# Patient Record
Sex: FEMALE | Race: WHITE | NOT HISPANIC OR LATINO | Employment: STUDENT | URBAN - METROPOLITAN AREA
[De-identification: names, ages, dates, MRNs, and addresses within clinical notes are randomized per-mention and may not be internally consistent; named-entity substitution may affect disease eponyms.]

---
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
## 2024-01-11 DIAGNOSIS — J45.30 MILD PERSISTENT ASTHMA WITHOUT COMPLICATION (HHS-HCC): ICD-10-CM

## 2024-01-11 RX ORDER — FLUTICASONE PROPIONATE 50 UG/1
1 POWDER, METERED RESPIRATORY (INHALATION) 2 TIMES DAILY
Qty: 60 EACH | Refills: 3 | Status: SHIPPED | OUTPATIENT
Start: 2024-01-11

## 2024-01-22 ENCOUNTER — LAB (OUTPATIENT)
Dept: LAB | Facility: LAB | Age: 19
End: 2024-01-22
Payer: COMMERCIAL

## 2024-01-22 ENCOUNTER — OFFICE VISIT (OUTPATIENT)
Dept: ALLERGY | Facility: CLINIC | Age: 19
End: 2024-01-22
Payer: COMMERCIAL

## 2024-01-22 VITALS
RESPIRATION RATE: 18 BRPM | SYSTOLIC BLOOD PRESSURE: 136 MMHG | WEIGHT: 181.7 LBS | HEIGHT: 66 IN | TEMPERATURE: 98.2 F | BODY MASS INDEX: 29.2 KG/M2 | DIASTOLIC BLOOD PRESSURE: 64 MMHG | HEART RATE: 90 BPM | OXYGEN SATURATION: 98 %

## 2024-01-22 DIAGNOSIS — J45.990 ASTHMA, EXERCISE INDUCED (HHS-HCC): Primary | ICD-10-CM

## 2024-01-22 DIAGNOSIS — J32.9 CHRONIC RHINOSINUSITIS: ICD-10-CM

## 2024-01-22 PROCEDURE — 36415 COLL VENOUS BLD VENIPUNCTURE: CPT

## 2024-01-22 PROCEDURE — 99214 OFFICE O/P EST MOD 30 MIN: CPT | Performed by: ALLERGY & IMMUNOLOGY

## 2024-01-22 PROCEDURE — 86003 ALLG SPEC IGE CRUDE XTRC EA: CPT

## 2024-01-22 PROCEDURE — 82785 ASSAY OF IGE: CPT

## 2024-01-22 PROCEDURE — 86317 IMMUNOASSAY INFECTIOUS AGENT: CPT

## 2024-01-22 PROCEDURE — 96160 PT-FOCUSED HLTH RISK ASSMT: CPT | Performed by: ALLERGY & IMMUNOLOGY

## 2024-01-22 ASSESSMENT — ENCOUNTER SYMPTOMS
RESPIRATORY NEGATIVE: 1
MUSCULOSKELETAL NEGATIVE: 1
GASTROINTESTINAL NEGATIVE: 1
CONSTITUTIONAL NEGATIVE: 1
CARDIOVASCULAR NEGATIVE: 1
EYES NEGATIVE: 1
NEUROLOGICAL NEGATIVE: 1
ENDOCRINE NEGATIVE: 1
PSYCHIATRIC NEGATIVE: 1
HEMATOLOGIC/LYMPHATIC NEGATIVE: 1

## 2024-01-22 NOTE — PROGRESS NOTES
"Subjective   Patient ID: Kenyetta Mclaughlin is a 18 y.o. female.    Chief Complaint: follow up  17 yo woman last seen 8/11/23 June 2023 had negative skin tests to environmental allergy triggers.  She has a history of chronic sinusitis and mild persistent asthma  Last visit received HIB and Pneumovax.  Repeat labs showed excellent response to vaccines.    Patient uses her Flonase for congestion.  She feels that she was more congested at home more so than at college.  There is a cat and a dog in the home.    Patient is going to [facility] and staying in a home with 2 cats.  She is a student at [employer].    Review of Systems   Constitutional: Negative.    HENT: Negative.     Eyes: Negative.    Respiratory: Negative.     Cardiovascular: Negative.    Gastrointestinal: Negative.    Endocrine: Negative.    Genitourinary: Negative.    Musculoskeletal: Negative.    Neurological: Negative.    Hematological: Negative.    Psychiatric/Behavioral: Negative.       /64   Pulse 90   Temp 36.8 °C (98.2 °F) (Oral)   Resp 18   Ht 1.676 m (5' 6\")   Wt 82.4 kg (181 lb 11.2 oz)   SpO2 98%   BMI 29.33 kg/m²     Objective   Physical Exam  Vitals and nursing note reviewed.   Constitutional:       Appearance: Normal appearance.   HENT:      Right Ear: Tympanic membrane normal.      Left Ear: Tympanic membrane normal.      Nose: Nose normal.      Mouth/Throat:      Mouth: Mucous membranes are moist.   Eyes:      Conjunctiva/sclera: Conjunctivae normal.      Pupils: Pupils are equal, round, and reactive to light.   Cardiovascular:      Rate and Rhythm: Normal rate and regular rhythm.      Heart sounds: Normal heart sounds.   Pulmonary:      Effort: Pulmonary effort is normal.      Breath sounds: Normal breath sounds.   Abdominal:      General: Bowel sounds are normal.      Palpations: Abdomen is soft.   Musculoskeletal:         General: Normal range of motion.   Skin:     General: Skin is warm and dry.      Capillary Refill: " Capillary refill takes less than 2 seconds.   Neurological:      General: No focal deficit present.      Mental Status: She is alert and oriented to person, place, and time.   Psychiatric:         Mood and Affect: Mood normal.     Laboratory:   Haemophilus Influenzae b Ab IgG  Order: [id number]  Status: Final result       Visible to patient: No (inaccessible in [facility])       Dx: Chronic rhinitis    0 Result Notes       Component  Ref Range & Units 3 mo ago 7 mo ago   Haemophilus influenzae b Antibody, IgG  ug/mL 14.9 0.0 CM   Comment: INTERPRETIVE INFORMATION: H. Influenzae b Ab, IgG        Component  Ref Range & Units 3 mo ago 7 mo ago   Serotype 1  ug/mL 5.09 0.10   Serotype 2  ug/mL >27.30 1.97   Serotype 3  ug/mL 4.80 3.85   Serotype 4  ug/mL 2.63 0.19   Serotype 5  ug/mL 7.02 1.76   Serotype 8  ug/mL 1.85 0.28   Serotype 9N  ug/mL 3.74 1.38   Serotype 12F  ug/mL 0.70 0.32   Serotype 14  ug/mL 3.89 0.45   Serotype 17F  ug/mL 4.31 2.99   Serotype 19F  ug/mL 27.39 1.71   Serotype 20  ug/mL 5.83 0.70   Serotype 22F  ug/mL 11.54 2.40   Serotype 23F  ug/mL 2.26 0.70   Serotype 6B(26)  ug/mL 1.63 2.11   Serotype 10A(34)  ug/mL 3.53 4.03   Serotype 11A(43)  ug/mL 1.31 2.34   Serotype 7F(51)  ug/mL 4.17 1.74   Serotype 15B(54)  ug/mL 9.12 0.72   Serotype 18C(56)  ug/mL 1.97 0.79   Serotype 19A(57)  ug/mL 7.08 4.47   Serotype 9V(68)  ug/mL 3.38 0.64   Serotype 33F(70)  ug/mL 4.04 0.33   Pneumo Serotype Interpretation See Note See Note CM   Comment: INTERPRETIVE INFORMATION: Streptococcus pneumoniae Antibodies, IgG   Immunoglobulins and complement were normal.    Assessment/Plan    19 yo with history of chronic rhino sinusitis. She has had increased congestion at home now.  She is using Flonase. There is concern for allergy to pets. She is going to [place] and living with a family who has 2 cats.  Asthma is stable on Flovent.  -repeat labs and I will call results  -continue with current medications.    Follow up 6  months

## 2024-01-23 LAB

## 2024-01-26 LAB
HAEM INFLU B IGG SER-MCNC: 3.1 UG/ML
S PN DA SERO 19F IGG SER-MCNC: >112.96 UG/ML
S PNEUM DA 1 IGG SER-MCNC: 4.48 UG/ML
S PNEUM DA 10A IGG SER-MCNC: 2 UG/ML
S PNEUM DA 11A IGG SER-MCNC: 1.99 UG/ML
S PNEUM DA 12F IGG SER-MCNC: 0.57 UG/ML
S PNEUM DA 14 IGG SER-MCNC: 4.3 UG/ML
S PNEUM DA 15B IGG SER-MCNC: 14.93 UG/ML
S PNEUM DA 17F IGG SER-MCNC: 4.68 UG/ML
S PNEUM DA 18C IGG SER-MCNC: 3.79 UG/ML
S PNEUM DA 19A IGG SER-MCNC: 4.57 UG/ML
S PNEUM DA 2 IGG SER-MCNC: >27.3 UG/ML
S PNEUM DA 20A IGG SER-MCNC: 13.21 UG/ML
S PNEUM DA 22F IGG SER-MCNC: 8.81 UG/ML
S PNEUM DA 23F IGG SER-MCNC: 2.52 UG/ML
S PNEUM DA 3 IGG SER-MCNC: 2.7 UG/ML
S PNEUM DA 33F IGG SER-MCNC: 10.33 UG/ML
S PNEUM DA 4 IGG SER-MCNC: 8.41 UG/ML
S PNEUM DA 5 IGG SER-MCNC: 10.23 UG/ML
S PNEUM DA 6B IGG SER-MCNC: 2.38 UG/ML
S PNEUM DA 7F IGG SER-MCNC: 9.73 UG/ML
S PNEUM DA 8 IGG SER-MCNC: 3.88 UG/ML
S PNEUM DA 9N IGG SER-MCNC: >11.04 UG/ML
S PNEUM DA 9V IGG SER-MCNC: >11.73 UG/ML
S PNEUM SEROTYPE IGG SER-IMP: NORMAL

## 2024-01-29 ENCOUNTER — TELEPHONE (OUTPATIENT)
Dept: ALLERGY | Facility: CLINIC | Age: 19
End: 2024-01-29
Payer: COMMERCIAL

## 2024-02-20 DIAGNOSIS — J45.30 MILD PERSISTENT ASTHMA WITHOUT COMPLICATION (HHS-HCC): Primary | ICD-10-CM

## 2024-08-01 ENCOUNTER — APPOINTMENT (OUTPATIENT)
Dept: ALLERGY | Facility: CLINIC | Age: 19
End: 2024-08-01
Payer: COMMERCIAL

## 2024-08-01 ENCOUNTER — LAB (OUTPATIENT)
Dept: LAB | Facility: LAB | Age: 19
End: 2024-08-01
Payer: COMMERCIAL

## 2024-08-01 VITALS
OXYGEN SATURATION: 98 % | HEIGHT: 66 IN | DIASTOLIC BLOOD PRESSURE: 80 MMHG | HEART RATE: 84 BPM | WEIGHT: 179 LBS | RESPIRATION RATE: 17 BRPM | SYSTOLIC BLOOD PRESSURE: 128 MMHG | TEMPERATURE: 98.3 F | BODY MASS INDEX: 28.77 KG/M2

## 2024-08-01 DIAGNOSIS — J32.9 CHRONIC RHINOSINUSITIS: Primary | ICD-10-CM

## 2024-08-01 DIAGNOSIS — B99.9 CHRONIC INFECTION: ICD-10-CM

## 2024-08-01 DIAGNOSIS — T78.40XD ALLERGY, SUBSEQUENT ENCOUNTER: ICD-10-CM

## 2024-08-01 DIAGNOSIS — J45.30 MILD PERSISTENT ASTHMA WITHOUT COMPLICATION (HHS-HCC): ICD-10-CM

## 2024-08-01 PROCEDURE — 96160 PT-FOCUSED HLTH RISK ASSMT: CPT | Performed by: ALLERGY & IMMUNOLOGY

## 2024-08-01 PROCEDURE — 86317 IMMUNOASSAY INFECTIOUS AGENT: CPT

## 2024-08-01 PROCEDURE — 99214 OFFICE O/P EST MOD 30 MIN: CPT | Performed by: ALLERGY & IMMUNOLOGY

## 2024-08-01 PROCEDURE — 36415 COLL VENOUS BLD VENIPUNCTURE: CPT

## 2024-08-01 PROCEDURE — 3008F BODY MASS INDEX DOCD: CPT | Performed by: ALLERGY & IMMUNOLOGY

## 2024-08-01 PROCEDURE — 83520 IMMUNOASSAY QUANT NOS NONAB: CPT

## 2024-08-01 RX ORDER — ALBUTEROL SULFATE 90 UG/1
2 AEROSOL, METERED RESPIRATORY (INHALATION) EVERY 4 HOURS PRN
Qty: 18 G | Refills: 1 | Status: SHIPPED | OUTPATIENT
Start: 2024-08-01 | End: 2024-08-31

## 2024-08-01 RX ORDER — FLUTICASONE FUROATE 27.5 UG/1
2 SPRAY, METERED NASAL
Qty: 10 G | Refills: 11 | Status: SHIPPED | OUTPATIENT
Start: 2024-08-01 | End: 2025-08-01

## 2024-08-01 NOTE — PROGRESS NOTES
Patient ID: Kenyetta Mclaughlin is a 19 y.o. female.     Chief Complaint: follow up. Last seen 1/2024  History Of Present Illness  Kenyetta Mclaughlin is a 19 y.o. female with PMx chronic sinus presenting for follow up. History of mild persistent asthma.    [employer] for a semester.  Stayed healthy there.  Only time she was sick was when her sister visited sick.  Now with some more sneeze and congestions.  Using Flonase, does feel it helps with congestion and sneeze.    No issues with her breathing and no albuterol use.  Working more and being on her feet during the day. She is working at [employer].  She feels like one day at work had heaviness in the chest. She used an inhaler and she feels it helped. This issue has not recurred.      Review of Systems    Pertinent positives and negatives have been assessed in the HPI. All other systems have been reviewed and are negative except as noted in the HPI.    Allergies  Patient has no known allergies.    Past Medical History  She has a past medical history of Acute maxillary sinusitis, unspecified (10/21/2016) and Otitis media, unspecified, right ear (10/21/2015).    Family History  No family history on file.      Surgical History  She has no past surgical history on file.    Social/Environmental History  She has no history on file for tobacco use, alcohol use, and drug use.    MEDICATIONS  Current Outpatient Medications on File Prior to Visit   Medication Sig Dispense Refill    albuterol 90 mcg/actuation inhaler 2 puffs 20 mins before activity      cholecalciferol (Vitamin D-3) 1,250 mcg (50,000 unit) capsule Take one a week x 2 months      Flovent Diskus 50 mcg/actuation diskus inhaler Inhale 1 puff 2 times a day. 60 each 3    norethindrone ac-eth estradioL (Aurovela 1.5/30, 21,) 1.5-30 mg-mcg tablet tablet Take 1 tablet by mouth once daily. 90 tablet 3    [DISCONTINUED] Junel FE 1/20, 28, 1 mg-20 mcg (21)/75 mg (7) tablet TAKE 1 TABLET BY MOUTH EVERY DAY AS DIRECTED  "84 tablet 3    beclomethasone dipropionate (Qvar) 80 mcg/actuation inhaler Inhale 1 Inhalation 2 times a day. Rinse mouth out after inhalation. 10.6 g 3    brompheniramine-pseudoeph-DM 2-30-10 mg/5 mL syrup Give 5-10ml  every 6  hours as needed for symptoms: cough/cold/congestion and sore throat.      fluticasone (Flonase Sensimist) 27.5 mcg/actuation nasal spray Administer 2 sprays into each nostril once daily. 10 g 11    montelukast (Singulair) 10 mg tablet Take 1 tablet (10 mg) by mouth once daily.      montelukast (Singulair) 10 mg tablet Take 1 tablet (10 mg) by mouth once daily. 30 tablet 2    olopatadine (Pataday) 0.2 % ophthalmic solution ADMINISTER 1 DROP INTO AFFECTED EYE(S) ONCE DAILY. (Patient not taking: Reported on 1/22/2024) 5 mL 1    [DISCONTINUED] norethindrone-e.estradioL-iron 1 mg-20 mcg (24)/75 mg (4) capsule Take 1 tablet by mouth once daily.       No current facility-administered medications on file prior to visit.         Physical Exam  Visit Vitals  /80   Pulse 84   Temp 36.8 °C (98.3 °F) (Temporal)   Resp 17   Ht 1.676 m (5' 6\")   Wt 81.2 kg (179 lb)   SpO2 98%   BMI 28.89 kg/m²   BSA 1.94 m²       Wt Readings from Last 1 Encounters:   08/01/24 81.2 kg (179 lb)       Physical Exam    General: Well appearing, no acute distress  Head: Normocephalic, atraumatic, neck supple without lymphadenopathy  Eyes: EOMI, non-injected  Nose: No nasal crease, nares patent, slightly boggy turbinates, minimal discharge  Throat: Normal dentition, no erythema  Heart: Regular rate and rhythm  Lungs: Clear to auscultation bilaterally, effort normal  Abdomen: Soft, non-tender, normal bowel sounds  Extremities: Moves all extremities symmetrically, no edema  Skin: No rashes/lesions  Psych: normal mood and affect    LAB RESULTS:  CBC:  Recent Labs     03/21/23  1546 08/04/22  1312 07/25/22  1156   WBC 11.1 11.9* 10.1   HGB 12.7 13.0 12.7   HCT 39.3 39.2 38.8    439 463*   MCV 88 89 91   EOSABS 0.54 0.62 " 0.80*       CMP:  Recent Labs     03/21/23  1546 07/21/22  1529 10/01/21  1530    141 141   K 4.4 4.1 4.4    107 104   CO2 23 26 28*   ANIONGAP 15 12 13   BUN 15 16 19   CREATININE 0.85 0.71 0.81     Recent Labs     03/21/23  1546 07/21/22  1529 10/01/21  1530   ALBUMIN 4.4 4.4 4.8   ALKPHOS 43 62 62   ALT 23 19 13   AST 19 16 13   BILITOT 0.3 0.5 0.4       ALLERGY:   Lab Results   Component Value Date    ICIGE 26.1 01/22/2024    ICIGE 15.0 03/21/2023    WHITEASH <0.10 01/22/2024    WHITEASH <0.10 03/21/2023    SILVERBIRCH <0.10 01/22/2024    SILVERBIRCH <0.10 03/21/2023    BOXELDER <0.10 01/22/2024    BOXELDER <0.10 03/21/2023    MOUNTJUNIPER <0.10 01/22/2024    MOUNTJUNIPER <0.10 03/21/2023    COTTONWOOD <0.10 01/22/2024    COTTONWOOD <0.10 03/21/2023    ELM <0.10 01/22/2024    ELM <0.10 03/21/2023    MULBERRY <0.10 01/22/2024    MULBERRY <0.10 03/21/2023    PECANHICKORY <0.10 01/22/2024    PECANHICKORY <0.10 03/21/2023    MAPLESYCAMOR <0.10 01/22/2024    MAPLESYCAMOR <0.10 03/21/2023    OAK <0.10 01/22/2024    OAK <0.10 03/21/2023    BERMUDAGR <0.10 01/22/2024    BERMUDAGR <0.10 03/21/2023    JOHNSONGR <0.10 01/22/2024    JOHNSONGR <0.10 03/21/2023    BLUEGRASS <0.10 01/22/2024    BLUEGRASS <0.10 03/21/2023    TIMOTHYGRASS <0.10 01/22/2024    TIMOTHYGRASS <0.10 03/21/2023     Lab Results   Component Value Date    LAMBQUART <0.10 01/22/2024    LAMBQUART <0.10 03/21/2023    PIGWEED <0.10 01/22/2024    PIGWEED <0.10 03/21/2023    COMRAGWEED <0.10 01/22/2024    COMRAGWEED <0.10 03/21/2023    RUSSIANT <0.10 01/22/2024    SHEEPSOR <0.10 01/22/2024    SHEEPSOR <0.10 03/21/2023    PLANTAIN <0.10 01/22/2024    PLANTAIN <0.10 03/21/2023    CATEPI <0.10 01/22/2024    CATEPI <0.10 03/21/2023    DOGEPI <0.10 01/22/2024    DOGEPI <0.10 03/21/2023    ALTERNA <0.10 01/22/2024    ALTERNA <0.10 03/21/2023    CLADHERB <0.10 01/22/2024    CLADHERB <0.10 03/21/2023    ICA04 <0.10 01/22/2024    ICA04 <0.10 03/21/2023     PENICILLIUM <0.10 01/22/2024    DERMFAR <0.10 01/22/2024    DERMFAR <0.10 03/21/2023    DERMPTE <0.10 01/22/2024    DERMPTE <0.10 03/21/2023    COCKR <0.10 01/22/2024    COCKR <0.10 03/21/2023     Lab Results   Component Value Date    PEANUT <0.10 03/21/2023    WALNUT <0.10 01/22/2024    WALNUT <0.10 03/21/2023    WALNUT <0.10 03/21/2023    SHRIMP <0.10 03/21/2023    CLAM <0.10 03/21/2023    SCALLOP <0.10 03/21/2023     Recent Labs     01/22/24  1056 03/21/23  1546   ICIGE 26.1 15.0     Recent Labs     03/21/23  1546 08/04/22  1312 07/25/22  1156   EOSABS 0.54 0.62 0.80*       IMMUNO:   Recent Labs     06/26/23  1227   IGG 1,030      IGM 74         HEME/ENDO:  Recent Labs     03/21/23  1546 07/25/22  1156 07/21/22  1529 10/01/21  1530   TSH 1.46  --  0.87 1.21   HGBA1C  --  5.5  --   --         Strep Pneumo IgG Ab 23 Serotypes  Order: [id number]   Status: Final result       Visible to patient: No (inaccessible in [facility])       Dx: Chronic rhinosinusitis    0 Result Notes        Component  Ref Range & Units 6 mo ago 9 mo ago 1 yr ago   Serotype 1  ug/mL 4.48 5.09 0.10   Serotype 2  ug/mL >27.30 >27.30 1.97   Serotype 3  ug/mL 2.70 4.80 3.85   Serotype 4  ug/mL 8.41 2.63 0.19   Serotype 5  ug/mL 10.23 7.02 1.76   Serotype 8  ug/mL 3.88 1.85 0.28   Serotype 9N  ug/mL >11.04 3.74 1.38   Serotype 12F  ug/mL 0.57 0.70 0.32   Serotype 14  ug/mL 4.30 3.89 0.45   Serotype 17F  ug/mL 4.68 4.31 2.99   Serotype 19F  ug/mL >112.96 27.39 1.71   Serotype 20  ug/mL 13.21 5.83 0.70   Serotype 22F  ug/mL 8.81 11.54 2.40   Serotype 23F  ug/mL 2.52 2.26 0.70   Serotype 6B(26)  ug/mL 2.38 1.63 2.11   Serotype 10A(34)  ug/mL 2.00 3.53 4.03   Serotype 11A(43)  ug/mL 1.99 1.31 2.34   Serotype 7F(51)  ug/mL 9.73 4.17 1.74   Serotype 15B(54)  ug/mL 14.93 9.12 0.72   Serotype 18C(56)  ug/mL 3.79 1.97 0.79   Serotype 19A(57)  ug/mL 4.57 7.08 4.47   Serotype 9V(68)  ug/mL >11.73 3.38 0.64   Serotype 33F(70)  ug/mL 10.33 4.04 0.33    Pneumo Serotype Interpretation See Note See Note CM See Note CM   Comment: INTERPRETIVE INFORMATION: Streptococcus pneumoniae Antibodies, IgG    A pre- and postvaccination comparison is required to adequately  assess the humoral immune response to the pure polysaccharide  Pneumovax 23 (PNX) and/or the protein conjugated Prevnar 7 (P7),  Prevnar 13 (P13), Prevnar 20 (P20), and Vaxneuvance (V15)  Streptococcus pneumoniae vaccines. Prevaccination samples should  be collected prior to vaccine administration. Postvaccination  samples should be obtained at least 4 weeks after immunization.  Testing of postvaccination samples alone will provide only general  immune status of the individual to various pneumococcal serotypes.    In the case of pure polysaccharide vaccine, indication of immune  system competence is further delineated as an adequate response to  at least 50 percent of the serotypes in the vaccine challenge for  those 2-5 years of age and to at least 70 percent of the serotypes  in the vaccine challenge for those 6-65 years of age. Individual   immune response may vary based on age, past exposure,  immunocompetence, and pneumococcal serotype.    Responder Status           Antibody Ratio      Nonresponder ........... Less than twofold increase and                             postvaccination concentration                             less than 1.3 ug/mL      Good responder ......... At least a twofold increase                             and/or a postvaccination                             concentration greater than or                             equal to 1.3 ug/mL    A response to 50-70 percent or more of the serotypes in the  vaccine challenge is considered a normal humoral response.(Stefanie,  2014) Antibody concentration greater than 1.0-1.3 ug/mL is  generally considered long-term protection.(Stefanie, 2015)    References:    1. Stefanie NATHAN, Malgorzata JW, Chacho X, et al. Multilaboratory  assessment of threshold versus  fold-change algorithms for  minimizing analytical variability in multiplexed pneumococcal IgG  measurements. Clin Vaccine Immunol. 2014;21(7):982-988.    2. Stefanie NATHAN, Karri HR. Use and clinical interpretation of  pneumococcal antibody measurements in the evaluation of humoral  immune function. Clin Vaccine Immunol. 2015;22(2):148-152.    This test was developed and its performance characteristics  determined by [facility]. It has not been cleared or  approved by the U.S. Food and Drug Administration. This test was  performed in a CLIA-certified laboratory and is intended for  clinical purposes.  Performed By: [facility]  [address] [place] [id number]  : Isreal Servin MD, PhD  CLIA Number: [id number]   Resulting Agency [facility]              Specimen Collected: 01/22/24 10:56 Last Resulted: 01/26/24 19:18        Lab Flowsheet        Order Details        View Encounter        Lab and Collection Details        Routing        Result History     View All Conversations on this Encounter        CM=Additional comments        Result Care Coordination      Patient Communication     Add Comments   Not seen Back to Top        Result Report    Strep Pneumo IgG Ab 23 Serotypes (Order #[id number]) on 1/22/24     Lab Component SmartPhrase Guide    Strep Pneumo IgG Ab 23 Serotypes (Order #[id number]) on 1/22/24     Related Results     Haemophilus Influenzae b Ab IgG Final result 1/22/2024                    Other Results from 1/22/2024     Respiratory Allergy Profile IgE Final result 1/22/2024   ACT 19    Assessment/Plan   Kenyetta is a 20 yo with a history of non allergic rhinitis/chronic rhinosinusits and mild persistent asthma.  She has a history of antibody deficiency, but responded well to vaccine booster challenge.  -Asthma stable and will continue Qvar. She did not feel Singulair helped and this is discontinued.  -Continue Flonase for nasal congestion and  drainage.  -Have labs and I will call results    Elisabeth Helm, DO

## 2024-08-05 LAB
MANNOSE-BP SER-MCNC: 5158 NG/ML
S PN DA SERO 19F IGG SER-MCNC: 6.69 UG/ML
S PNEUM DA 1 IGG SER-MCNC: 3.84 UG/ML
S PNEUM DA 10A IGG SER-MCNC: 1.61 UG/ML
S PNEUM DA 11A IGG SER-MCNC: 2.4 UG/ML
S PNEUM DA 12F IGG SER-MCNC: 0.31 UG/ML
S PNEUM DA 14 IGG SER-MCNC: 2.22 UG/ML
S PNEUM DA 15B IGG SER-MCNC: 7.69 UG/ML
S PNEUM DA 17F IGG SER-MCNC: 5.03 UG/ML
S PNEUM DA 18C IGG SER-MCNC: 1.79 UG/ML
S PNEUM DA 19A IGG SER-MCNC: 5.7 UG/ML
S PNEUM DA 2 IGG SER-MCNC: >27.3 UG/ML
S PNEUM DA 20A IGG SER-MCNC: 5.27 UG/ML
S PNEUM DA 22F IGG SER-MCNC: 5.16 UG/ML
S PNEUM DA 23F IGG SER-MCNC: 1.26 UG/ML
S PNEUM DA 3 IGG SER-MCNC: 2.06 UG/ML
S PNEUM DA 33F IGG SER-MCNC: 5.01 UG/ML
S PNEUM DA 4 IGG SER-MCNC: 1.84 UG/ML
S PNEUM DA 5 IGG SER-MCNC: 4.69 UG/ML
S PNEUM DA 6B IGG SER-MCNC: 0.97 UG/ML
S PNEUM DA 7F IGG SER-MCNC: 3.5 UG/ML
S PNEUM DA 8 IGG SER-MCNC: 1.55 UG/ML
S PNEUM DA 9N IGG SER-MCNC: 10.77 UG/ML
S PNEUM DA 9V IGG SER-MCNC: 3.65 UG/ML
S PNEUM SEROTYPE IGG SER-IMP: NORMAL

## 2024-08-12 ENCOUNTER — APPOINTMENT (OUTPATIENT)
Dept: PEDIATRICS | Facility: CLINIC | Age: 19
End: 2024-08-12
Payer: COMMERCIAL

## 2024-08-12 VITALS
HEART RATE: 72 BPM | SYSTOLIC BLOOD PRESSURE: 122 MMHG | HEIGHT: 67 IN | WEIGHT: 184.2 LBS | DIASTOLIC BLOOD PRESSURE: 84 MMHG | BODY MASS INDEX: 28.91 KG/M2

## 2024-08-12 DIAGNOSIS — Z00.129 ENCOUNTER FOR ROUTINE CHILD HEALTH EXAMINATION WITHOUT ABNORMAL FINDINGS: Primary | ICD-10-CM

## 2024-08-12 DIAGNOSIS — M25.561 ARTHRALGIA OF BOTH KNEES: ICD-10-CM

## 2024-08-12 DIAGNOSIS — M25.562 ARTHRALGIA OF BOTH KNEES: ICD-10-CM

## 2024-08-12 PROCEDURE — 3008F BODY MASS INDEX DOCD: CPT | Performed by: NURSE PRACTITIONER

## 2024-08-12 PROCEDURE — 99395 PREV VISIT EST AGE 18-39: CPT | Performed by: NURSE PRACTITIONER

## 2024-08-12 NOTE — PROGRESS NOTES
Subjective   Patient ID: Kenyetta Mclaughlin is a 19 y.o. female who presents for Well Child (19 yr well visit).  HPI  Concerns today: none, knee pain on going  some labs were elevated  last year dance club ice skating    Medications: Qvar albuterol    Allergies:nkda    Sleep: sleeping  8  hrs nightly , awakes feeling well rested  Diet:  eating fruits veggies, no special diet, eating meats drinking milk and/or dairy , drinks water, no vitamins or supplements being taken  [place]: no issues with constipation   Dental: visits dentist every 6 mos , brushes teeth regularly  School: In the jr year [employer]  grade , grades are   good   +has friends   Activities: participates in working 30 week [employer]  Drugs/Alcohol/Tobacco: denies any use   Sexuality/Puberty: females menses regular, linden stage-  Any concerns with depression or anxiety: PHQ-9 score- 0     Review of Systems  Review of symptoms all normal except for those mentioned in HPI.  Objective   Physical Exam  General: Well-developed, well-nourished, alert and oriented, no acute distress  Eyes: Normal sclera, OSCAR, EOMI. Red reflex intact, light reflex symmetric.   ENT: Moist mucous membranes, normal throat, no nasal discharge. TMs are normal.  Cardiac:  Normal S1/S2, regular rhythm. Capillary refill less than 2 seconds. No clinically significant murmurs.    Pulmonary: Clear to auscultation bilaterally, no work of breathing.  GI: Soft nontender nondistended abdomen, no HSM, no masses.    Skin: No specific or unusual rashes  Neuro: Symmetric face, no ataxia, grossly normal strength.  Lymph and Neck: No lymphadenopathy, no visible thyroid swelling.  Orthopedic:  moving all extremities well  :  normal external genitalia, linden 1.    Assessment/Plan   Diagnoses and all orders for this visit:  Encounter for routine child health examination without abnormal findings  Pediatric body mass index (BMI) of 85th percentile to less than 95th percentile for age  Arthralgia of both  knees  -     Referral to Rheumatology; Future       Yearly physicals, referral to rheum. For evaluation,    ANTONIO Holbrook-CARRINGTON 08/12/24 10:23 AM

## 2024-10-05 DIAGNOSIS — N92.6 MENSTRUAL ABNORMALITY: ICD-10-CM

## 2024-10-05 RX ORDER — NORETHINDRONE ACETATE AND ETHINYL ESTRADIOL 1.5; 3 MG/1; UG/1
1 TABLET ORAL DAILY
Qty: 84 TABLET | Refills: 2 | Status: SHIPPED | OUTPATIENT
Start: 2024-10-05

## 2024-10-11 ENCOUNTER — LAB (OUTPATIENT)
Dept: LAB | Facility: LAB | Age: 19
End: 2024-10-11
Payer: COMMERCIAL

## 2024-10-11 ENCOUNTER — APPOINTMENT (OUTPATIENT)
Dept: RHEUMATOLOGY | Facility: CLINIC | Age: 19
End: 2024-10-11
Payer: COMMERCIAL

## 2024-10-11 VITALS
SYSTOLIC BLOOD PRESSURE: 137 MMHG | BODY MASS INDEX: 29.41 KG/M2 | WEIGHT: 185 LBS | HEART RATE: 81 BPM | TEMPERATURE: 97.3 F | DIASTOLIC BLOOD PRESSURE: 83 MMHG

## 2024-10-11 DIAGNOSIS — M25.561 ARTHRALGIA OF BOTH KNEES: Primary | ICD-10-CM

## 2024-10-11 DIAGNOSIS — M25.561 ARTHRALGIA OF BOTH KNEES: ICD-10-CM

## 2024-10-11 DIAGNOSIS — M25.562 ARTHRALGIA OF BOTH KNEES: ICD-10-CM

## 2024-10-11 DIAGNOSIS — M25.50 ARTHRALGIA, UNSPECIFIED JOINT: ICD-10-CM

## 2024-10-11 DIAGNOSIS — M25.562 ARTHRALGIA OF BOTH KNEES: Primary | ICD-10-CM

## 2024-10-11 LAB
ALBUMIN SERPL BCP-MCNC: 4.7 G/DL (ref 3.4–5)
ALP SERPL-CCNC: 54 U/L (ref 33–110)
ALT SERPL W P-5'-P-CCNC: 17 U/L (ref 7–45)
ANION GAP SERPL CALC-SCNC: 16 MMOL/L (ref 10–20)
AST SERPL W P-5'-P-CCNC: 16 U/L (ref 9–39)
BASOPHILS # BLD AUTO: 0.09 X10*3/UL (ref 0–0.1)
BASOPHILS NFR BLD AUTO: 0.7 %
BILIRUB SERPL-MCNC: 0.3 MG/DL (ref 0–1.2)
BUN SERPL-MCNC: 12 MG/DL (ref 6–23)
CALCIUM SERPL-MCNC: 9.7 MG/DL (ref 8.6–10.6)
CHLORIDE SERPL-SCNC: 103 MMOL/L (ref 98–107)
CO2 SERPL-SCNC: 24 MMOL/L (ref 21–32)
CREAT SERPL-MCNC: 0.61 MG/DL (ref 0.5–1.05)
CRP SERPL-MCNC: 3.19 MG/DL
EGFRCR SERPLBLD CKD-EPI 2021: >90 ML/MIN/1.73M*2
EOSINOPHIL # BLD AUTO: 0.49 X10*3/UL (ref 0–0.7)
EOSINOPHIL NFR BLD AUTO: 3.6 %
ERYTHROCYTE [DISTWIDTH] IN BLOOD BY AUTOMATED COUNT: 12.5 % (ref 11.5–14.5)
ERYTHROCYTE [SEDIMENTATION RATE] IN BLOOD BY WESTERGREN METHOD: 39 MM/H (ref 0–20)
GLUCOSE SERPL-MCNC: 107 MG/DL (ref 74–99)
HCT VFR BLD AUTO: 39 % (ref 36–46)
HGB BLD-MCNC: 12.4 G/DL (ref 12–16)
IMM GRANULOCYTES # BLD AUTO: 0.04 X10*3/UL (ref 0–0.7)
IMM GRANULOCYTES NFR BLD AUTO: 0.3 % (ref 0–0.9)
LYMPHOCYTES # BLD AUTO: 3.07 X10*3/UL (ref 1.2–4.8)
LYMPHOCYTES NFR BLD AUTO: 22.3 %
MCH RBC QN AUTO: 28.2 PG (ref 26–34)
MCHC RBC AUTO-ENTMCNC: 31.8 G/DL (ref 32–36)
MCV RBC AUTO: 89 FL (ref 80–100)
MONOCYTES # BLD AUTO: 1 X10*3/UL (ref 0.1–1)
MONOCYTES NFR BLD AUTO: 7.3 %
NEUTROPHILS # BLD AUTO: 9.09 X10*3/UL (ref 1.2–7.7)
NEUTROPHILS NFR BLD AUTO: 65.8 %
NRBC BLD-RTO: 0 /100 WBCS (ref 0–0)
PLATELET # BLD AUTO: 441 X10*3/UL (ref 150–450)
POTASSIUM SERPL-SCNC: 4.1 MMOL/L (ref 3.5–5.3)
PROT SERPL-MCNC: 7.5 G/DL (ref 6.4–8.2)
RBC # BLD AUTO: 4.39 X10*6/UL (ref 4–5.2)
RHEUMATOID FACT SER NEPH-ACNC: <10 IU/ML (ref 0–15)
SODIUM SERPL-SCNC: 139 MMOL/L (ref 136–145)
WBC # BLD AUTO: 13.8 X10*3/UL (ref 4.4–11.3)

## 2024-10-11 PROCEDURE — 86140 C-REACTIVE PROTEIN: CPT

## 2024-10-11 PROCEDURE — 85025 COMPLETE CBC W/AUTO DIFF WBC: CPT

## 2024-10-11 PROCEDURE — 80053 COMPREHEN METABOLIC PANEL: CPT

## 2024-10-11 PROCEDURE — 99204 OFFICE O/P NEW MOD 45 MIN: CPT | Performed by: INTERNAL MEDICINE

## 2024-10-11 PROCEDURE — 86038 ANTINUCLEAR ANTIBODIES: CPT

## 2024-10-11 PROCEDURE — 85652 RBC SED RATE AUTOMATED: CPT

## 2024-10-11 PROCEDURE — 1036F TOBACCO NON-USER: CPT | Performed by: INTERNAL MEDICINE

## 2024-10-11 PROCEDURE — 36415 COLL VENOUS BLD VENIPUNCTURE: CPT

## 2024-10-11 PROCEDURE — 86200 CCP ANTIBODY: CPT

## 2024-10-11 PROCEDURE — 86431 RHEUMATOID FACTOR QUANT: CPT

## 2024-10-11 ASSESSMENT — PAIN SCALES - GENERAL: PAINLEVEL: 0-NO PAIN

## 2024-10-11 NOTE — PROGRESS NOTES
Subjective   Patient ID: Kenyetta Mclaughlin is a 19 y.o. female who presents for Follow-up.    HPI  18 yo female with joint pain  She reports intermittent joint pain in her knees and wrists  She is having joint pain once a week and it lasts whole day  She denies any swollen joints  She endorses some stiffness in her knees lasts only a few minutes.  Her joint pain started almost 3 years ago.  Her lab tests showed high WBC before  HemOnc saw her and they did not detect any problem.  She denies skin rashes, psoriasis, oral ulcer, hair loss, Raynaud  She reports dry eyes, but no dry mouth  Family h/o no inflammatory rheumatic diseases  She denies smoking, alcohol or vaping  Her PMH showed asthma and leukocytosis, no known diagnosis  ROS  Joint pain in hands: negative   Joint swelling: negative  Morning stiffness and duration: negative   strength: normal  Oral ulcer: negative  Genital ulcer: negative  Raynaud phenomenon: negative  Chest pain/dyspnea: negative  Low back pain: negative  Visual problem: negative  Dry eyes/dry mouth: negative  Skin rash/scaling/psoriasis: negative       Objective     PEXAM  VS reviewed, WNL  General: Alert, no distress   HEENT: Normocephalic/atraumatic, No alopecia. PERRLA. Sclera white, conjunctiva pink, no malar rash. no oral or nasal ulcer. Oral cavity pink and moist, no erythema or exudate, dentition good.   Neck: supple  Respiratory: CTA B, no adventitious breath sounds  Cardiac: RRR, no murmurs, carotid, or bruits  Abdominal: symmetrical, soft, non-tender, non-distended, normoactive BSx4 quadrants, no CVA tenderness or suprapubic tenderness  MSK: Joints of upper and lower extremities were assessed for synovitis and ROM.    Today she has no evidence of synovitis in the joints of her hands or wrists, tender joint count 0, swollen joint count 0   Extremities: no clubbing, no cyanosis, no edema  Skin: Skin warm and moist.   Neuro: non-focal, Strength 5/5 throughout. Normal gait. No  cerebellar pathologic exam     Assessment/Plan   20 yo female with recurrent joint pain  For 3 years, she has been having intermittent joint pain in her hands and  knees, no swelling or stiffness.  Her pain episodes last less than one day.  No other rash, Raynaud etc.  PExam did not reveal any synovitis.  I do not think she has any inflammatory arthritis  -will see her ESR, cRP, RF, CCP  -will repeat her tests if she has a flare

## 2024-10-12 ENCOUNTER — OFFICE VISIT (OUTPATIENT)
Dept: URGENT CARE | Age: 19
End: 2024-10-12
Payer: COMMERCIAL

## 2024-10-12 VITALS
SYSTOLIC BLOOD PRESSURE: 129 MMHG | HEART RATE: 81 BPM | DIASTOLIC BLOOD PRESSURE: 85 MMHG | OXYGEN SATURATION: 97 % | RESPIRATION RATE: 16 BRPM | TEMPERATURE: 98.4 F

## 2024-10-12 DIAGNOSIS — H60.502 ACUTE OTITIS EXTERNA OF LEFT EAR, UNSPECIFIED TYPE: Primary | ICD-10-CM

## 2024-10-12 LAB — CCP IGG SERPL-ACNC: <1 U/ML

## 2024-10-12 RX ORDER — CEPHALEXIN 500 MG/1
500 CAPSULE ORAL 3 TIMES DAILY
Qty: 21 CAPSULE | Refills: 0 | Status: SHIPPED | OUTPATIENT
Start: 2024-10-12 | End: 2024-10-19

## 2024-10-12 RX ORDER — NEOMYCIN SULFATE, POLYMYXIN B SULFATE, HYDROCORTISONE 3.5; 10000; 1 MG/ML; [USP'U]/ML; MG/ML
3 SOLUTION/ DROPS AURICULAR (OTIC) 4 TIMES DAILY
Qty: 10 ML | Refills: 0 | Status: SHIPPED | OUTPATIENT
Start: 2024-10-12 | End: 2024-10-22

## 2024-10-12 NOTE — PROGRESS NOTES
Subjective   Patient ID: Kenyetta Mclaughlin is a 19 y.o. female. They present today with a chief complaint of Earache (Left ear pain X 3 days. Ear canal swollen shut X 1 day. CELIA-MA).    History of Present Illness    Earache       Patient presents to urgent care for a chief complaint of left ear swelling and pain since Thursday, presents to urgent care in presence of mother is able to hear states that hearing is muffled, no blood or discharge no swimming, hot tubs or lakes.  Past Medical History  Allergies as of 10/12/2024    (No Known Allergies)       (Not in a hospital admission)       Past Medical History:   Diagnosis Date    Acute maxillary sinusitis, unspecified 10/21/2016    Maxillary sinusitis, acute    Otitis media, unspecified, right ear 10/21/2015    Right otitis media       No past surgical history on file.     reports that she has never smoked. She has never used smokeless tobacco.    Review of Systems  Review of Systems   HENT:  Positive for ear pain.    All other systems reviewed and are negative.                                 Objective    Vitals:    10/12/24 1002   BP: 129/85   Pulse: 81   Resp: 16   Temp: 36.9 °C (98.4 °F)   SpO2: 97%     No LMP recorded.    Physical Exam  Vitals and nursing note reviewed.   Constitutional:       General: She is not in acute distress.     Appearance: Normal appearance. She is not ill-appearing, toxic-appearing or diaphoretic.   HENT:      Head: Normocephalic and atraumatic.      Right Ear: Tympanic membrane, ear canal and external ear normal. There is no impacted cerumen.      Left Ear: External ear normal.      Ears:      Comments: Upon examination of left external auditory canal left external auditory canal is edematous slightly erythematous presence of discharge unable to appreciate tympanic membrane due to edema, patient hearing intact  Neurological:      General: No focal deficit present.      Mental Status: She is alert and oriented to person, place, and time.    Psychiatric:         Mood and Affect: Mood normal.         Behavior: Behavior normal.         Procedures    Point of Care Test & Imaging Results from this visit  No results found for this visit on 10/12/24.   No results found.    Diagnostic study results (if any) were reviewed by Tyron Gonzalez PA-C.    Assessment/Plan   Allergies, medications, history, and pertinent labs/EKGs/Imaging reviewed by Tyron Gonzalez PA-C.     Medical Decision Making  Patient will be placed on Cortisporin eardrops, will also place on Keflex did cover instructions of use.  If no resolution or regression of symptoms in 5 to 7 days may return to urgent care for reevaluation or follow-up with ENT or primary care.  Discharge from urgent care A+O stable condition no signs of distress patient hearing intact    Orders and Diagnoses  Diagnoses and all orders for this visit:  Acute otitis externa of left ear, unspecified type  -     cephalexin (Keflex) 500 mg capsule; Take 1 capsule (500 mg) by mouth 3 times a day for 7 days.  -     neomycin-polymyxin-HC (Cortisporin) otic solution; Administer 3 drops into the left ear 4 times a day for 10 days.      Medical Admin Record      Patient disposition: Home    Electronically signed by Tyron Gonzalez PA-C  10:06 AM

## 2024-10-14 DIAGNOSIS — M25.50 ARTHRALGIA, UNSPECIFIED JOINT: Primary | ICD-10-CM

## 2024-10-14 LAB — ANA SER QL HEP2 SUBST: NEGATIVE

## 2025-06-05 ENCOUNTER — TELEPHONE (OUTPATIENT)
Dept: PEDIATRICS | Facility: CLINIC | Age: 20
End: 2025-06-05
Payer: COMMERCIAL

## 2025-06-05 LAB
BASOPHILS # BLD AUTO: 94 CELLS/UL (ref 0–200)
BASOPHILS NFR BLD AUTO: 0.8 %
CRP SERPL-MCNC: 11.6 MG/L
EOSINOPHIL # BLD AUTO: 573 CELLS/UL (ref 15–500)
EOSINOPHIL NFR BLD AUTO: 4.9 %
ERYTHROCYTE [DISTWIDTH] IN BLOOD BY AUTOMATED COUNT: 14.1 % (ref 11–15)
ERYTHROCYTE [SEDIMENTATION RATE] IN BLOOD BY WESTERGREN METHOD: 25 MM/H
HCT VFR BLD AUTO: 40.4 % (ref 35–45)
HGB BLD-MCNC: 12.6 G/DL (ref 11.7–15.5)
LYMPHOCYTES # BLD AUTO: 3147 CELLS/UL (ref 850–3900)
LYMPHOCYTES NFR BLD AUTO: 26.9 %
MCH RBC QN AUTO: 28.2 PG (ref 27–33)
MCHC RBC AUTO-ENTMCNC: 31.2 G/DL (ref 32–36)
MCV RBC AUTO: 90.4 FL (ref 80–100)
MONOCYTES # BLD AUTO: 644 CELLS/UL (ref 200–950)
MONOCYTES NFR BLD AUTO: 5.5 %
NEUTROPHILS # BLD AUTO: 7242 CELLS/UL (ref 1500–7800)
NEUTROPHILS NFR BLD AUTO: 61.9 %
PLATELET # BLD AUTO: 440 THOUSAND/UL (ref 140–400)
PMV BLD REES-ECKER: 9.9 FL (ref 7.5–12.5)
RBC # BLD AUTO: 4.47 MILLION/UL (ref 3.8–5.1)
WBC # BLD AUTO: 11.7 THOUSAND/UL (ref 3.8–10.8)

## 2025-06-05 NOTE — TELEPHONE ENCOUNTER
Kenyetta was seen with Rheumatology yesterday and didn't feel too comfortable with the provider. She had lab work done and the results say abnormal. Would you be able to let Kenyetta know what her results mean? If you are unable to do that do you have any recommendations on a different Rheumatologist she can go to? Thanks!

## 2025-08-12 ENCOUNTER — APPOINTMENT (OUTPATIENT)
Dept: PEDIATRICS | Facility: CLINIC | Age: 20
End: 2025-08-12
Payer: COMMERCIAL

## 2025-08-12 VITALS
WEIGHT: 191 LBS | DIASTOLIC BLOOD PRESSURE: 85 MMHG | HEART RATE: 96 BPM | HEIGHT: 67 IN | BODY MASS INDEX: 29.98 KG/M2 | SYSTOLIC BLOOD PRESSURE: 123 MMHG

## 2025-08-12 DIAGNOSIS — Z00.00 ADULT WELLNESS VISIT: Primary | ICD-10-CM

## 2025-08-12 DIAGNOSIS — N92.6 MENSTRUAL ABNORMALITY: ICD-10-CM

## 2025-08-12 DIAGNOSIS — J45.30 MILD PERSISTENT ASTHMA WITHOUT COMPLICATION (HHS-HCC): ICD-10-CM

## 2025-08-12 DIAGNOSIS — J45.990 ASTHMA, EXERCISE INDUCED: ICD-10-CM

## 2025-08-12 DIAGNOSIS — Z00.00 WELL ADULT EXAM: ICD-10-CM

## 2025-08-12 PROCEDURE — 90715 TDAP VACCINE 7 YRS/> IM: CPT | Performed by: NURSE PRACTITIONER

## 2025-08-12 PROCEDURE — 90471 IMMUNIZATION ADMIN: CPT | Performed by: NURSE PRACTITIONER

## 2025-08-12 PROCEDURE — 99395 PREV VISIT EST AGE 18-39: CPT | Performed by: NURSE PRACTITIONER

## 2025-08-12 PROCEDURE — 3008F BODY MASS INDEX DOCD: CPT | Performed by: NURSE PRACTITIONER

## 2025-08-12 RX ORDER — NORETHINDRONE ACETATE AND ETHINYL ESTRADIOL .03; 1.5 MG/1; MG/1
TABLET ORAL
Qty: 112 TABLET | Refills: 3 | Status: SHIPPED | OUTPATIENT
Start: 2025-08-12

## 2025-08-15 ENCOUNTER — RESULTS FOLLOW-UP (OUTPATIENT)
Dept: PEDIATRICS | Facility: CLINIC | Age: 20
End: 2025-08-15
Payer: COMMERCIAL

## 2025-08-15 LAB
ALBUMIN SERPL-MCNC: 4.4 G/DL (ref 3.6–5.1)
ALP SERPL-CCNC: 51 U/L (ref 31–125)
ALT SERPL-CCNC: 21 U/L (ref 6–29)
ANION GAP SERPL CALCULATED.4IONS-SCNC: 11 MMOL/L (CALC) (ref 7–17)
AST SERPL-CCNC: 21 U/L (ref 10–30)
BASOPHILS # BLD AUTO: 70 CELLS/UL (ref 0–200)
BASOPHILS NFR BLD AUTO: 0.6 %
BILIRUB SERPL-MCNC: 0.4 MG/DL (ref 0.2–1.2)
BUN SERPL-MCNC: 14 MG/DL (ref 7–25)
CALCIUM SERPL-MCNC: 9.5 MG/DL (ref 8.6–10.2)
CHLORIDE SERPL-SCNC: 103 MMOL/L (ref 98–110)
CHOLEST SERPL-MCNC: 258 MG/DL
CHOLEST/HDLC SERPL: 5.4 (CALC)
CO2 SERPL-SCNC: 24 MMOL/L (ref 20–32)
CREAT SERPL-MCNC: 0.63 MG/DL (ref 0.5–0.96)
EGFRCR SERPLBLD CKD-EPI 2021: 129 ML/MIN/1.73M2
EOSINOPHIL # BLD AUTO: 510 CELLS/UL (ref 15–500)
EOSINOPHIL NFR BLD AUTO: 4.4 %
ERYTHROCYTE [DISTWIDTH] IN BLOOD BY AUTOMATED COUNT: 13.1 % (ref 11–15)
GLUCOSE SERPL-MCNC: 84 MG/DL (ref 65–99)
HCT VFR BLD AUTO: 43.3 % (ref 35–45)
HDLC SERPL-MCNC: 48 MG/DL
HGB BLD-MCNC: 13.6 G/DL (ref 11.7–15.5)
IRON SATN MFR SERPL: 23 % (CALC) (ref 16–45)
IRON SERPL-MCNC: 104 MCG/DL (ref 40–190)
LDLC SERPL CALC-MCNC: 168 MG/DL (CALC)
LYMPHOCYTES # BLD AUTO: 1937 CELLS/UL (ref 850–3900)
LYMPHOCYTES NFR BLD AUTO: 16.7 %
MCH RBC QN AUTO: 28.3 PG (ref 27–33)
MCHC RBC AUTO-ENTMCNC: 31.4 G/DL (ref 32–36)
MCV RBC AUTO: 90.2 FL (ref 80–100)
MONOCYTES # BLD AUTO: 951 CELLS/UL (ref 200–950)
MONOCYTES NFR BLD AUTO: 8.2 %
NEUTROPHILS # BLD AUTO: 8132 CELLS/UL (ref 1500–7800)
NEUTROPHILS NFR BLD AUTO: 70.1 %
NONHDLC SERPL-MCNC: 210 MG/DL (CALC)
PLATELET # BLD AUTO: 464 THOUSAND/UL (ref 140–400)
PMV BLD REES-ECKER: 9.9 FL (ref 7.5–12.5)
POTASSIUM SERPL-SCNC: 4.5 MMOL/L (ref 3.5–5.3)
PROT SERPL-MCNC: 7.3 G/DL (ref 6.1–8.1)
RBC # BLD AUTO: 4.8 MILLION/UL (ref 3.8–5.1)
SODIUM SERPL-SCNC: 138 MMOL/L (ref 135–146)
TIBC SERPL-MCNC: 455 MCG/DL (CALC) (ref 250–450)
TRIGL SERPL-MCNC: 246 MG/DL
WBC # BLD AUTO: 11.6 THOUSAND/UL (ref 3.8–10.8)

## 2025-08-25 DIAGNOSIS — J45.30 MILD PERSISTENT ASTHMA WITHOUT COMPLICATION (HHS-HCC): ICD-10-CM
